# Patient Record
Sex: MALE | Race: WHITE | NOT HISPANIC OR LATINO | Employment: FULL TIME | URBAN - METROPOLITAN AREA
[De-identification: names, ages, dates, MRNs, and addresses within clinical notes are randomized per-mention and may not be internally consistent; named-entity substitution may affect disease eponyms.]

---

## 2017-02-27 ENCOUNTER — OFFICE VISIT (OUTPATIENT)
Dept: MEDICAL GROUP | Facility: MEDICAL CENTER | Age: 32
End: 2017-02-27
Payer: COMMERCIAL

## 2017-02-27 VITALS
SYSTOLIC BLOOD PRESSURE: 116 MMHG | HEART RATE: 100 BPM | RESPIRATION RATE: 16 BRPM | OXYGEN SATURATION: 97 % | WEIGHT: 158.8 LBS | HEIGHT: 66 IN | DIASTOLIC BLOOD PRESSURE: 70 MMHG | BODY MASS INDEX: 25.52 KG/M2 | TEMPERATURE: 98.5 F

## 2017-02-27 DIAGNOSIS — Z00.00 ANNUAL PHYSICAL EXAM: ICD-10-CM

## 2017-02-27 PROCEDURE — 99385 PREV VISIT NEW AGE 18-39: CPT | Performed by: PHYSICIAN ASSISTANT

## 2017-02-27 RX ORDER — ACETAMINOPHEN 325 MG/1
650 TABLET ORAL EVERY 4 HOURS PRN
COMMUNITY
End: 2017-02-27

## 2017-02-27 ASSESSMENT — PATIENT HEALTH QUESTIONNAIRE - PHQ9: CLINICAL INTERPRETATION OF PHQ2 SCORE: 0

## 2017-02-27 NOTE — ASSESSMENT & PLAN NOTE
This is a 31-year-old male with no complaints today. He did go to [facility] and had labs drawn. They told him he was jaundiced. They mentioned nothing about the color of his eyes or the color of his skin. It was based on his laboratories. His total bilirubin was mildly elevated. A ultrasound was performed that showed a fatty liver. Other labs that I saw showed no specific abnormalities. He denies any fevers. No fatigue. No nausea vomiting. No abdominal pain. No bloating. No diarrhea or constipation. Does have a family history of diabetes and hypertension with his parents. He is . He has a 3-year-old daughter.

## 2017-02-27 NOTE — PROGRESS NOTES
"Subjective:   Vj Givens is a 31 y.o. male here today for an annual physical.    Annual physical exam  This is a 31-year-old male with no complaints today. He did go to [facility] and had labs drawn. They told him he was jaundiced. They mentioned nothing about the color of his eyes or the color of his skin. It was based on his laboratories. His total bilirubin was mildly elevated. A ultrasound was performed that showed a fatty liver. Other labs that I saw showed no specific abnormalities. He denies any fevers. No fatigue. No nausea vomiting. No abdominal pain. No bloating. No diarrhea or constipation. Does have a family history of diabetes and hypertension with his parents. He is . He has a 3-year-old daughter.       Current medicines (including changes today)  Current Outpatient Prescriptions   Medication Sig Dispense Refill   • Non Formulary Request Take 300 mg by mouth every day. Indications: Ursodeoxycholic Acid     • NON SPECIFIED Indications: Revital H       No current facility-administered medications for this visit.     He  has no past medical history on file.    ROS   No chest pain, no shortness of breath, no abdominal pain and all other systems were reviewed and are negative.       Objective:     Blood pressure 116/70, pulse 100, temperature 36.9 °C (98.5 °F), resp. rate 16, height 1.676 m (5' 6\"), weight 72.031 kg (158 lb 12.8 oz), SpO2 97 %. Body mass index is 25.64 kg/(m^2).   Physical Exam:  Constitutional: Alert, no distress.  Skin: Warm, dry, good turgor, no rashes in visible areas.  Eye: Equal, round and reactive, conjunctiva clear, lids normal.  ENMT: Lips without lesions, good dentition, oropharynx clear.  Neck: Trachea midline, no masses.   Lymph: No cervical or supraclavicular lymphadenopathy  Respiratory: Unlabored respiratory effort, lungs clear to auscultation, no wheezes, no ronchi.  Cardiovascular: Normal S1, S2, no murmur, no edema.  Abdomen: Soft, non-tender, no masses.  Psych: Alert " and oriented x3, normal affect and mood.        Assessment and Plan:   The following treatment plan was discussed    1. Annual physical exam  Generally healthy male. No signs of jaundice. Obtained fasting labs. He will be contacted with results.  - CBC WITH DIFFERENTIAL; Future  - COMP METABOLIC PANEL; Future  - URINALYSIS, COMPLETE  - HEMOGLOBIN A1C; Future  - LIPID PANEL      Followup: Return if symptoms worsen or fail to improve.    Please note that this dictation was created using voice recognition software. I have made every reasonable attempt to correct obvious errors, but I expect that there are errors of grammar and possibly content that I did not discover before finalizing the note.

## 2017-02-28 LAB — HBA1C MFR BLD: 6 % (ref ?–5.8)

## 2017-03-03 ENCOUNTER — OFFICE VISIT (OUTPATIENT)
Dept: MEDICAL GROUP | Facility: MEDICAL CENTER | Age: 32
End: 2017-03-03
Payer: COMMERCIAL

## 2017-03-03 VITALS
HEIGHT: 66 IN | DIASTOLIC BLOOD PRESSURE: 72 MMHG | WEIGHT: 156.8 LBS | OXYGEN SATURATION: 97 % | HEART RATE: 90 BPM | RESPIRATION RATE: 16 BRPM | SYSTOLIC BLOOD PRESSURE: 116 MMHG | BODY MASS INDEX: 25.2 KG/M2 | TEMPERATURE: 98.3 F

## 2017-03-03 DIAGNOSIS — R73.03 PREDIABETES: ICD-10-CM

## 2017-03-03 DIAGNOSIS — E78.1 HYPERTRIGLYCERIDEMIA: ICD-10-CM

## 2017-03-03 PROCEDURE — 99213 OFFICE O/P EST LOW 20 MIN: CPT | Performed by: PHYSICIAN ASSISTANT

## 2017-03-03 NOTE — ASSESSMENT & PLAN NOTE
This is a 31-year-old male comes in today to discuss his labs. His concern over having jaundice in [facility] which was diagnosed through his labs have resolved. His labs were unremarkable for any considerable elevation in his hepatic function tests. Also his total bilirubin numbers resolved. He does have prediabetes. Hemoglobin A1c was 6.0. Fasting glucose level was 110. He has a predominantly  diet. He's lost of vegetables. Doesn't eat any sweets. Does add sugar to his cups of tea and coffee which he has two per day. Parents both have diabetes.

## 2017-03-03 NOTE — PROGRESS NOTES
"Subjective:   Vj Givens is a 31 y.o. male here today for labs follow-up and new onset of prediabetes and hypertriglyceridemia.    Prediabetes  This is a 31-year-old male comes in today to discuss his labs. His concern over having jaundice in [facility] which was diagnosed through his labs have resolved. His labs were unremarkable for any considerable elevation in his hepatic function tests. Also his total bilirubin numbers resolved. He does have prediabetes. Hemoglobin A1c was 6.0. Fasting glucose level was 110. He has a predominantly [place] diet. He's lost of vegetables. Doesn't eat any sweets. Does add sugar to his cups of tea and coffee which he has two per day. Parents both have diabetes.    Hypertriglyceridemia  His cholesterol returned with triglycerides in the 300s. LDL was slightly elevated at 105. HDL low at 31.       Current medicines (including changes today)  Current Outpatient Prescriptions   Medication Sig Dispense Refill   • Non Formulary Request Take 300 mg by mouth every day. Indications: Ursodeoxycholic Acid     • NON SPECIFIED Indications: Revital H       No current facility-administered medications for this visit.     He  has no past medical history on file.    ROS   No chest pain, no shortness of breath, no abdominal pain and all other systems were reviewed and are negative.       Objective:     Blood pressure 116/72, pulse 90, temperature 36.8 °C (98.3 °F), resp. rate 16, height 1.676 m (5' 6\"), weight 71.124 kg (156 lb 12.8 oz), SpO2 97 %. Body mass index is 25.32 kg/(m^2).   Physical Exam:  Constitutional: Alert, no distress.  Skin: Warm, dry, good turgor, no rashes in visible areas.  Eye: Equal, round and reactive, conjunctiva clear, lids normal.  ENMT: Lips without lesions, good dentition, oropharynx clear.  Neck: Trachea midline, no masses.   Respiratory: Unlabored respiratory effort, lungs appear clear.  Cardiovascular: Regular rate and rhythm.   Psych: Alert and oriented x3, normal affect " and mood.        Assessment and Plan:   The following treatment plan was discussed    1. Prediabetes  New onset condition. Discussed with exercise routine. Advised to exercise up to 150 minutes weekly. Cut back on carbohydrates. Eat more proteins and vegetables. Concerned about his family history. Repeat hemoglobin A1c in 3 months.  - HEMOGLOBIN A1C; Future    2. Hypertriglyceridemia  New onset condition. Discussed with diet and exercise. We will order lipid profile in 2 months. Offered referral to see a nutritionist but he declined today. Contact me with any questions.  - LIPID PROFILE; Future      Followup: Return if symptoms worsen or fail to improve.    Please note that this dictation was created using voice recognition software. I have made every reasonable attempt to correct obvious errors, but I expect that there are errors of grammar and possibly content that I did not discover before finalizing the note.

## 2017-03-03 NOTE — ASSESSMENT & PLAN NOTE
His cholesterol returned with triglycerides in the 300s. LDL was slightly elevated at 105. HDL low at 31.

## 2018-06-25 ENCOUNTER — APPOINTMENT (OUTPATIENT)
Dept: RADIOLOGY | Facility: IMAGING CENTER | Age: 33
End: 2018-06-25
Attending: NURSE PRACTITIONER
Payer: COMMERCIAL

## 2018-06-25 ENCOUNTER — OFFICE VISIT (OUTPATIENT)
Dept: URGENT CARE | Facility: CLINIC | Age: 33
End: 2018-06-25
Payer: COMMERCIAL

## 2018-06-25 VITALS
WEIGHT: 150 LBS | DIASTOLIC BLOOD PRESSURE: 70 MMHG | RESPIRATION RATE: 16 BRPM | HEART RATE: 92 BPM | SYSTOLIC BLOOD PRESSURE: 112 MMHG | TEMPERATURE: 98 F | HEIGHT: 66 IN | BODY MASS INDEX: 24.11 KG/M2 | OXYGEN SATURATION: 98 %

## 2018-06-25 DIAGNOSIS — M54.50 ACUTE MIDLINE LOW BACK PAIN WITHOUT SCIATICA: ICD-10-CM

## 2018-06-25 DIAGNOSIS — M54.50 LUMBAR BACK PAIN: ICD-10-CM

## 2018-06-25 PROCEDURE — 99213 OFFICE O/P EST LOW 20 MIN: CPT | Performed by: NURSE PRACTITIONER

## 2018-06-25 PROCEDURE — 72100 X-RAY EXAM L-S SPINE 2/3 VWS: CPT | Mod: TC,FY | Performed by: NURSE PRACTITIONER

## 2018-06-25 RX ORDER — IBUPROFEN 800 MG/1
800 TABLET ORAL EVERY 8 HOURS PRN
Qty: 30 TAB | Refills: 1 | Status: SHIPPED | OUTPATIENT
Start: 2018-06-25 | End: 2018-07-09

## 2018-06-25 RX ORDER — METAXALONE 800 MG/1
800 TABLET ORAL 3 TIMES DAILY PRN
Qty: 30 TAB | Refills: 1 | Status: SHIPPED | OUTPATIENT
Start: 2018-06-25 | End: 2018-08-13

## 2018-06-25 RX ORDER — KETOROLAC TROMETHAMINE 30 MG/ML
60 INJECTION, SOLUTION INTRAMUSCULAR; INTRAVENOUS ONCE
Status: COMPLETED | OUTPATIENT
Start: 2018-06-25 | End: 2018-06-25

## 2018-06-25 RX ADMIN — KETOROLAC TROMETHAMINE 60 MG: 30 INJECTION, SOLUTION INTRAMUSCULAR; INTRAVENOUS at 11:59

## 2018-06-25 ASSESSMENT — ENCOUNTER SYMPTOMS
SORE THROAT: 0
BACK PAIN: 1
VOMITING: 0
COUGH: 0
FEVER: 0
DIZZINESS: 0
NAUSEA: 0
SHORTNESS OF BREATH: 0
DIARRHEA: 0
HEADACHES: 0
MYALGIAS: 0
PALPITATIONS: 0
CHILLS: 0

## 2018-06-25 ASSESSMENT — PATIENT HEALTH QUESTIONNAIRE - PHQ9: CLINICAL INTERPRETATION OF PHQ2 SCORE: 0

## 2018-06-25 NOTE — PROGRESS NOTES
"Subjective:      Vj Givens is a 32 y.o. male who presents with Back Pain    Chief Complaint   Patient presents with   • Back Pain     Patient presents with a three-day history of back pain. He states it is difficult to stay in one position for a long time. He denies any trauma to the area. He has been using an Aspercreme and no medicine. He has never had this before. He has no weakness, no numbness or tingling, no problems with bowel or bladder control  Pain is burning and tingling  Is in his lower back and radiates toward his buttocks  It does not radiate down his legs          HPI    Review of Systems   Constitutional: Negative for chills, fever and malaise/fatigue.   HENT: Negative for congestion and sore throat.    Respiratory: Negative for cough and shortness of breath.    Cardiovascular: Negative for chest pain and palpitations.   Gastrointestinal: Negative for diarrhea, nausea and vomiting.   Genitourinary: Negative for dysuria.   Musculoskeletal: Positive for back pain. Negative for myalgias.   Skin: Negative for rash.   Neurological: Negative for dizziness and headaches.     No past medical history on file.  Family history reviewed and not related to this visit  Social History     Social History   • Marital status:      Spouse name: N/A   • Number of children: N/A   • Years of education: N/A     Occupational History   • Not on file.     Social History Main Topics   • Smoking status: Never Smoker   • Smokeless tobacco: Never Used   • Alcohol use No   • Drug use: No   • Sexual activity: No     Other Topics Concern   • Not on file     Social History Narrative   • No narrative on file          Objective:     /70   Pulse 92   Temp 36.7 °C (98 °F)   Resp 16   Ht 1.676 m (5' 6\")   Wt 68 kg (150 lb)   SpO2 98%   BMI 24.21 kg/m²      Physical Exam   Constitutional: He is oriented to person, place, and time. He appears well-developed and well-nourished. No distress.   HENT:   Head: " Normocephalic and atraumatic.   Neck: Normal range of motion. Neck supple.   Cardiovascular: Normal rate.    Pulmonary/Chest: Effort normal and breath sounds normal. No respiratory distress. He has no wheezes. He has no rales.   Musculoskeletal: Normal range of motion.        Lumbar back: He exhibits tenderness, bony tenderness and pain.   Lymphadenopathy:     He has no cervical adenopathy.   Neurological: He is alert and oriented to person, place, and time.   Skin: Skin is warm and dry.   Psychiatric: He has a normal mood and affect. His behavior is normal. Judgment and thought content normal.   Nursing note and vitals reviewed.       Xray:   Impression       No evidence of acute compression. If symptoms are persistent, CT would be recommended for further evaluation.          Assessment/Plan:     1. Lumbar back pain    - DX-LUMBAR SPINE-2 OR 3 VIEWS; Future  - ketorolac (TORADOL) injection 60 mg; 2 mL by Intramuscular route Once.      No heavy lifting,   Stretches and ROM exercises,   Please maintain good posture and use good supportive shoes if on feet for prolong periods of time.  Heat/ice for comfort    Please make an appointment for follow up with your primary care provider or make appointment to establish with a primary care. Return for any perception of change in condition, worsening of symptoms, such as perception of worse pain, worsening fever, not getting better, or any other concerns. Please go to the nearest emergency room for any shortness of breath or chest pain or for any of the emergent conditions we have discussed. Patient and/or family states understanding to plan of care, and discharge instructions. Different diagnosis were discussed and signs/symptoms were discussed to educate on.

## 2018-06-25 NOTE — PATIENT INSTRUCTIONS
Lumbosacral Strain  Lumbosacral strain is an injury that causes pain in the lower back (lumbosacral spine). This injury usually occurs from overstretching the muscles or ligaments along your spine. A strain can affect one or more muscles or cord-like tissues that connect bones to other bones (ligaments).  What are the causes?  This condition may be caused by:  · A hard, direct hit (blow) to the back.  · Excessive stretching of the lower back muscles. This may result from:  ¨ A fall.  ¨ Lifting something heavy.  ¨ Repetitive movements such as bending or crouching.  What increases the risk?  The following factors may increase your risk of getting this condition:  · Participating in sports or activities that involve:  ¨ A sudden twist of the back.  ¨ Pushing or pulling motions.  · Being overweight or obese.  · Having poor strength and flexibility, especially tight hamstrings or weak muscles in the back or abdomen.  · Having too much of a curve in the lower back.  · Having a pelvis that is tilted forward.  What are the signs or symptoms?  The main symptom of this condition is pain in the lower back, at the site of the strain. Pain may extend (radiate) down one or both legs.  How is this diagnosed?  This condition is diagnosed based on:  · Your symptoms.  · Your medical history.  · A physical exam.  ¨ Your health care provider may push on certain areas of your back to determine the source of your pain.  ¨ You may be asked to bend forward, backward, and side to side to assess the severity of your pain and your range of motion.  · Imaging tests, such as:  ¨ X-rays.  ¨ MRI.  How is this treated?  Treatment for this condition may include:  · Putting heat and cold on the affected area.  · Medicines to help relieve pain and relax your muscles (muscle relaxants).  · NSAIDs to help reduce swelling and discomfort.  When your symptoms improve, it is important to gradually return to your normal routine as soon as possible to reduce  pain, avoid stiffness, and avoid loss of muscle strength. Generally, symptoms should improve within 6 weeks of treatment. However, recovery time varies.  Follow these instructions at home:  Managing pain, stiffness, and swelling  · If directed, put ice on the injured area during the first 24 hours after your strain.  ¨ Put ice in a plastic bag.  ¨ Place a towel between your skin and the bag.  ¨ Leave the ice on for 20 minutes, 2-3 times a day.  · If directed, put heat on the affected area as often as told by your health care provider. Use the heat source that your health care provider recommends, such as a moist heat pack or a heating pad.  ¨ Place a towel between your skin and the heat source.  ¨ Leave the heat on for 20-30 minutes.  ¨ Remove the heat if your skin turns bright red. This is especially important if you are unable to feel pain, heat, or cold. You may have a greater risk of getting burned.  Activity  · Rest and return to your normal activities as told by your health care provider. Ask your health care provider what activities are safe for you.  · Avoid activities that take a lot of energy for as long as told by your health care provider.  General instructions  · Take over-the-counter and prescription medicines only as told by your health care provider.  · Do not drive or use heavy machinery while taking prescription pain medicine.  · Do not use any products that contain nicotine or tobacco, such as cigarettes and e-cigarettes. If you need help quitting, ask your health care provider.  · Keep all follow-up visits as told by your health care provider. This is important.  How is this prevented?  · Use correct form when playing sports and lifting heavy objects.  · Use good posture when sitting and standing.  · Maintain a healthy weight.  · Sleep on a mattress with medium firmness to support your back.  · Be safe and responsible while being active to avoid falls.  · Do at least 150 minutes of  moderate-intensity exercise each week, such as brisk walking or water aerobics. Try a form of exercise that takes stress off your back, such as swimming or stationary cycling.  · Maintain physical fitness, including:  ¨ Strength.  ¨ Flexibility.  ¨ Cardiovascular fitness.  ¨ Endurance.  Contact a health care provider if:  · Your back pain does not improve after 6 weeks of treatment.  · Your symptoms get worse.  Get help right away if:  · Your back pain is severe.  · You cannot stand or walk.  · You have difficulty controlling when you urinate or when you have a bowel movement.  · You feel nauseous or you vomit.  · Your feet get very cold.  · You have numbness, tingling, weakness, or problems using your arms or legs.  · You develop any of the following:  ¨ Shortness of breath.  ¨ Dizziness.  ¨ Pain in your legs.  ¨ Weakness in your buttocks or legs.  ¨ Discoloration of the skin on your toes or legs.  This information is not intended to replace advice given to you by your health care provider. Make sure you discuss any questions you have with your health care provider.  Document Released: 09/27/2006 Document Revised: 07/07/2017 Document Reviewed: 05/21/2017  [employer] Interactive Patient Education © 2017 [employer]

## 2018-06-26 ENCOUNTER — TELEPHONE (OUTPATIENT)
Dept: URGENT CARE | Facility: CLINIC | Age: 33
End: 2018-06-26

## 2018-06-26 DIAGNOSIS — M54.50 ACUTE BILATERAL LOW BACK PAIN WITHOUT SCIATICA: ICD-10-CM

## 2018-06-26 RX ORDER — METHOCARBAMOL 750 MG/1
750 TABLET, FILM COATED ORAL 4 TIMES DAILY PRN
OUTPATIENT
Start: 2018-06-26

## 2018-06-26 RX ORDER — METHOCARBAMOL 750 MG/1
750 TABLET, FILM COATED ORAL 4 TIMES DAILY PRN
Qty: 56 TAB | Refills: 0 | Status: SHIPPED | OUTPATIENT
Start: 2018-06-26 | End: 2018-07-10

## 2018-06-27 NOTE — TELEPHONE ENCOUNTER
Message   Received: Today   Message Contents   JODY Levin, Med Ass't   Caller: Unspecified (Today, 12:09 PM)             I sent him a new prescription    Previous Messages

## 2018-08-13 ENCOUNTER — OFFICE VISIT (OUTPATIENT)
Dept: URGENT CARE | Facility: CLINIC | Age: 33
End: 2018-08-13
Payer: COMMERCIAL

## 2018-08-13 VITALS
RESPIRATION RATE: 18 BRPM | BODY MASS INDEX: 24.11 KG/M2 | HEIGHT: 66 IN | OXYGEN SATURATION: 99 % | HEART RATE: 78 BPM | TEMPERATURE: 98.2 F | DIASTOLIC BLOOD PRESSURE: 70 MMHG | SYSTOLIC BLOOD PRESSURE: 112 MMHG | WEIGHT: 150 LBS

## 2018-08-13 DIAGNOSIS — R11.2 DRUG-INDUCED NAUSEA AND VOMITING: ICD-10-CM

## 2018-08-13 DIAGNOSIS — T50.905A DRUG-INDUCED NAUSEA AND VOMITING: ICD-10-CM

## 2018-08-13 DIAGNOSIS — K08.89 PAIN IN TOOTH: ICD-10-CM

## 2018-08-13 PROCEDURE — 99214 OFFICE O/P EST MOD 30 MIN: CPT | Performed by: PHYSICIAN ASSISTANT

## 2018-08-13 RX ORDER — AMOXICILLIN 500 MG/1
CAPSULE ORAL
Refills: 0 | COMMUNITY
Start: 2018-08-09

## 2018-08-13 RX ORDER — HYDROCODONE BITARTRATE AND ACETAMINOPHEN 5; 325 MG/1; MG/1
TABLET ORAL
Refills: 0 | COMMUNITY
Start: 2018-08-09 | End: 2018-08-13 | Stop reason: SINTOL

## 2018-08-13 RX ORDER — IBUPROFEN 800 MG/1
800 TABLET ORAL EVERY 8 HOURS PRN
Qty: 6 TAB | Refills: 0 | Status: SHIPPED | OUTPATIENT
Start: 2018-08-13 | End: 2018-08-15

## 2018-08-13 ASSESSMENT — ENCOUNTER SYMPTOMS
VOMITING: 1
HEADACHES: 1
COUGH: 0
BLOOD IN STOOL: 0
HEARTBURN: 0
WHEEZING: 0
DIARRHEA: 0
NAUSEA: 1
SHORTNESS OF BREATH: 0
LOSS OF CONSCIOUSNESS: 0
CONSTIPATION: 0
FEVER: 0
ABDOMINAL PAIN: 0
CHILLS: 0

## 2018-08-13 NOTE — PATIENT INSTRUCTIONS
Food Choices to Help, Adult  When you have diarrhea, the foods you eat and your eating habits are very important. Choosing the right foods and drinks can help relieve diarrhea. Also, because diarrhea can last up to 7 days, you need to replace lost fluids and electrolytes (such as sodium, potassium, and chloride) in order to help prevent dehydration.   WHAT GENERAL GUIDELINES DO I NEED TO FOLLOW?  · Slowly drink 1 cup (8 oz) of fluid for each episode of diarrhea. If you are getting enough fluid, your urine will be clear or pale yellow.  · Eat starchy foods. Some good choices include white rice, white toast, pasta, low-fiber cereal, baked potatoes (without the skin), saltine crackers, and bagels.  · Avoid large servings of any cooked vegetables.  · Limit fruit to two servings per day. A serving is ½ cup or 1 small piece.  · Choose foods with less than 2 g of fiber per serving.  · Limit fats to less than 8 tsp (38 g) per day.  · Avoid fried foods.  · Eat foods that have probiotics in them. Probiotics can be found in certain dairy products.  · Avoid foods and beverages that may increase the speed at which food moves through the stomach and intestines (gastrointestinal tract). Things to avoid include:  ¨ High-fiber foods, such as dried fruit, raw fruits and vegetables, nuts, seeds, and whole grain foods.  ¨ Spicy foods and high-fat foods.  ¨ Foods and beverages sweetened with high-fructose corn syrup, honey, or sugar alcohols such as xylitol, sorbitol, and mannitol.  WHAT FOODS ARE RECOMMENDED?  Grains  White rice. White, Polish, or scott breads (fresh or toasted), including plain rolls, buns, or bagels. White pasta. Saltine, soda, or bhumi crackers. Pretzels. Low-fiber cereal. Cooked cereals made with water (such as cornmeal, farina, or cream cereals). Plain muffins. Matzo. Yaima toast. Zwieback.   Vegetables  Potatoes (without the skin). Strained tomato and vegetable juices. Most well-cooked and canned vegetables  without seeds. Tender lettuce.  Fruits  Cooked or canned applesauce, apricots, cherries, fruit cocktail, grapefruit, peaches, pears, or plums. Fresh bananas, apples without skin, cherries, grapes, cantaloupe, grapefruit, peaches, oranges, or plums.   Meat and Other Protein Products  Baked or boiled chicken. Eggs. Tofu. Fish. Seafood. Smooth peanut butter. Ground or well-cooked tender beef, ham, veal, lamb, pork, or poultry.   Dairy  Plain yogurt, kefir, and unsweetened liquid yogurt. Lactose-free milk, buttermilk, or soy milk. Plain hard cheese.  Beverages  Sport drinks. Clear broths. Diluted fruit juices (except prune). Regular, caffeine-free sodas such as ginger ale. Water. Decaffeinated teas. Oral rehydration solutions. Sugar-free beverages not sweetened with sugar alcohols.  Other  Bouillon, broth, or soups made from recommended foods.   The items listed above may not be a complete list of recommended foods or beverages. Contact your dietitian for more options.  WHAT FOODS ARE NOT RECOMMENDED?  Grains  Whole grain, whole wheat, bran, or rye breads, rolls, pastas, crackers, and cereals. Wild or brown rice. Cereals that contain more than 2 g of fiber per serving. Corn tortillas or taco shells. Cooked or dry oatmeal. Granola. Popcorn.  Vegetables  Raw vegetables. Cabbage, broccoli, [place] sprouts, artichokes, baked beans, beet greens, corn, kale, legumes, peas, sweet potatoes, and yams. Potato skins. Cooked spinach and cabbage.  Fruits  Dried fruit, including raisins and dates. Raw fruits. Stewed or dried prunes. Fresh apples with skin, apricots, mangoes, pears, raspberries, and strawberries.   Meat and Other Protein Products  [place] peanut butter. Nuts and seeds. Beans and lentils. Prieto.   Dairy  High-fat cheeses. Milk, chocolate milk, and beverages made with milk, such as milk shakes. Cream. Ice cream.  Sweets and Desserts  Sweet rolls, doughnuts, and sweet breads. Pancakes and waffles.  Fats and Oils  Butter.  Cream sauces. Margarine. Salad oils. Plain salad dressings. Olives. Avocados.   Beverages  Caffeinated beverages (such as coffee, tea, soda, or energy drinks). Alcoholic beverages. Fruit juices with pulp. Prune juice. Soft drinks sweetened with high-fructose corn syrup or sugar alcohols.  Other  Coconut. Hot sauce. Chili powder. Mayonnaise. Gravy. Cream-based or milk-based soups.   The items listed above may not be a complete list of foods and beverages to avoid. Contact your dietitian for more information.  WHAT SHOULD I DO IF I BECOME DEHYDRATED?  Diarrhea can sometimes lead to dehydration. Signs of dehydration include dark urine and dry mouth and skin. If you think you are dehydrated, you should rehydrate with an oral rehydration solution. These solutions can be purchased at pharmacies, retail stores, or online.   Drink ½-1 cup (120-240 mL) of oral rehydration solution each time you have an episode of diarrhea. If drinking this amount makes your diarrhea worse, try drinking smaller amounts more often. For example, drink 1-3 tsp (5-15 mL) every 5-10 minutes.   A general rule for staying hydrated is to drink 1½-2 L of fluid per day. Talk to your health care provider about the specific amount you should be drinking each day. Drink enough fluids to keep your urine clear or pale yellow.     This information is not intended to replace advice given to you by your health care provider. Make sure you discuss any questions you have with your health care provider.     Document Released: 03/09/2005 Document Revised: 01/08/2016 Document Reviewed: 11/10/2014  [employer] Interactive Patient Education ©2016 [employer]

## 2018-08-13 NOTE — LETTER
August 13, 2018         Patient: Vj Givens   YOB: 1985   Date of Visit: 8/13/2018           To Whom it May Concern:    Vj Givens was seen in my clinic on 8/13/2018. He may return to work on 8/14/18.    If you have any questions or concerns, please don't hesitate to call.        Sincerely,           Marline Marx P.A.-C.  Electronically Signed

## 2018-08-13 NOTE — PROGRESS NOTES
Subjective:   Vj Givens is a 33 y.o. male who presents for Hypertension (BP check)    Pt had root canal done Thursday and has been taking Amoxicillin and Norco Q6 hours for pain. Pt had took wook with pain at 2:30 AM and took NORCO and amox without food. When he woke again at 6 am he felt nauseas and vomited. Pt was having BENNETT and vomiting that started this morning when he work up. He took BP this morning at work 9:15 /103. Denies recent traveling, sick contacts. First episode of its kind. Pt had banana and oatmeal for breakfast and vomited after. Pt able to keep down liquids.     Last BM: this morning, normal. Denies blood, dark/tarry stool.       Review of Systems   Constitutional: Negative for chills, fever and malaise/fatigue.   Respiratory: Negative for cough, shortness of breath and wheezing.    Gastrointestinal: Positive for nausea and vomiting. Negative for abdominal pain, blood in stool, constipation, diarrhea, heartburn and melena.   Genitourinary: Negative for dysuria, frequency and urgency.   Skin: Negative for rash.   Neurological: Positive for headaches. Negative for loss of consciousness.   All other systems reviewed and are negative.      PMH:  has no past medical history on file.  MEDS:   Current Outpatient Prescriptions:   •  ibuprofen (MOTRIN) 800 MG Tab, Take 1 Tab by mouth every 8 hours as needed for up to 2 days., Disp: 6 Tab, Rfl: 0  •  amoxicillin (AMOXIL) 500 MG Cap, TAKE ONE CAPSULE BY MOUTH EVERY 8 HOURS UNTIL FINISHED, Disp: , Rfl: 0  •  Non Formulary Request, Take 300 mg by mouth every day. Indications: Ursodeoxycholic Acid, Disp: , Rfl:   •  NON SPECIFIED, Indications: Revital H, Disp: , Rfl:     Current Facility-Administered Medications:   •  methocarbamol (ROBAXIN) tablet 750 mg, 750 mg, Oral, 4X/DAY PRN, Samina Barrett, A.P.N.  ALLERGIES: No Known Allergies  SURGHX: History reviewed. No pertinent surgical history.  SOCHX:  reports that he has never smoked. He has never  "used smokeless tobacco. He reports that he does not drink alcohol or use drugs.  Family History   Problem Relation Age of Onset   • Diabetes Mother    • Hypertension Mother    • Diabetes Father    • Hypertension Father         Objective:   /70   Pulse 78   Temp 36.8 °C (98.2 °F)   Resp 18   Ht 1.676 m (5' 6\")   Wt 68 kg (150 lb)   SpO2 99%   BMI 24.21 kg/m²     Physical Exam   Constitutional: He is oriented to person, place, and time. He appears well-developed and well-nourished. No distress.   HENT:   Head: Normocephalic and atraumatic.   Eyes: Pupils are equal, round, and reactive to light. Conjunctivae are normal.   Neck: Neck supple.   Cardiovascular: Normal rate and regular rhythm.    Pulmonary/Chest: Effort normal and breath sounds normal.   Abdominal: Soft. Bowel sounds are normal. He exhibits no distension and no mass. There is no tenderness. There is no rebound and no guarding.   Neurological: He is alert and oriented to person, place, and time.   Skin: Skin is warm and dry.   Psychiatric: He has a normal mood and affect. His behavior is normal.   Vitals reviewed.        Assessment/Plan:     1. Drug-induced nausea and vomiting  ibuprofen (MOTRIN) 800 MG Tab   2. Pain in tooth  ibuprofen (MOTRIN) 800 MG Tab       Discussed most likely d/t taking medications on empty stomach. Pt has felt nauseas from medications previously. Encouraged fluids and hydration salts. Pt given educational handout on BRAT diet. Follow-up with primary care provider within 7-10 days, red flags reviewed and emergency room precautions discussed.  Patient appears understanding of information.       Case reviewed and discussed with Kenny Hall PA-C during ROSSY Marx's training period.    And recommendations were discussed with the treating provider.     Kenny Hall PA-C     "

## 2024-12-22 NOTE — TELEPHONE ENCOUNTER
1. Caller Name: Vj                                         Call Back Number: [phone] (home)         Patient approves a detailed voicemail message: yes    Patient came in today and states the the insurance does not cover the metaxalone. He would like to know if we can change this med for something else.    endurance decreased/impaired balance/impaired postural control/decreased strength